# Patient Record
Sex: MALE | Race: BLACK OR AFRICAN AMERICAN | NOT HISPANIC OR LATINO | Employment: FULL TIME | RURAL
[De-identification: names, ages, dates, MRNs, and addresses within clinical notes are randomized per-mention and may not be internally consistent; named-entity substitution may affect disease eponyms.]

---

## 2021-08-13 ENCOUNTER — HOSPITAL ENCOUNTER (EMERGENCY)
Facility: HOSPITAL | Age: 41
Discharge: HOME | End: 2021-08-13
Payer: COMMERCIAL

## 2021-08-13 VITALS
DIASTOLIC BLOOD PRESSURE: 84 MMHG | OXYGEN SATURATION: 100 % | RESPIRATION RATE: 16 BRPM | HEIGHT: 67 IN | WEIGHT: 230 LBS | HEART RATE: 81 BPM | BODY MASS INDEX: 36.1 KG/M2 | TEMPERATURE: 98 F | SYSTOLIC BLOOD PRESSURE: 128 MMHG

## 2021-08-13 DIAGNOSIS — E86.0 DEHYDRATION: Primary | ICD-10-CM

## 2021-08-13 LAB
ALBUMIN SERPL BCP-MCNC: 4.5 G/DL (ref 3.5–5)
ALBUMIN/GLOB SERPL: 1 {RATIO}
ALP SERPL-CCNC: 109 U/L (ref 45–115)
ALT SERPL W P-5'-P-CCNC: 128 U/L (ref 16–61)
ANION GAP SERPL CALCULATED.3IONS-SCNC: 12 MMOL/L (ref 7–16)
ANION GAP SERPL CALCULATED.3IONS-SCNC: 18 MMOL/L (ref 7–16)
AST SERPL W P-5'-P-CCNC: 53 U/L (ref 15–37)
BACTERIA #/AREA URNS HPF: ABNORMAL /HPF
BASOPHILS # BLD AUTO: 0.02 K/UL (ref 0–0.2)
BASOPHILS NFR BLD AUTO: 0.2 % (ref 0–1)
BILIRUB SERPL-MCNC: 1 MG/DL (ref 0–1.2)
BILIRUB UR QL STRIP: ABNORMAL
BUN SERPL-MCNC: 20 MG/DL (ref 7–18)
BUN SERPL-MCNC: 21 MG/DL (ref 7–18)
BUN/CREAT SERPL: 10 (ref 6–20)
BUN/CREAT SERPL: 10 (ref 6–20)
CALCIUM SERPL-MCNC: 7.8 MG/DL (ref 8.5–10.1)
CALCIUM SERPL-MCNC: 8.7 MG/DL (ref 8.5–10.1)
CHLORIDE SERPL-SCNC: 105 MMOL/L (ref 98–107)
CHLORIDE SERPL-SCNC: 99 MMOL/L (ref 98–107)
CK SERPL-CCNC: 400 U/L (ref 39–308)
CLARITY UR: CLEAR
CO2 SERPL-SCNC: 24 MMOL/L (ref 21–32)
CO2 SERPL-SCNC: 28 MMOL/L (ref 21–32)
COLOR UR: ABNORMAL
CREAT SERPL-MCNC: 1.94 MG/DL (ref 0.7–1.3)
CREAT SERPL-MCNC: 2.13 MG/DL (ref 0.7–1.3)
DIFFERENTIAL METHOD BLD: ABNORMAL
EOSINOPHIL # BLD AUTO: 0.06 K/UL (ref 0–0.5)
EOSINOPHIL NFR BLD AUTO: 0.5 % (ref 1–4)
ERYTHROCYTE [DISTWIDTH] IN BLOOD BY AUTOMATED COUNT: 13.7 % (ref 11.5–14.5)
FLUAV AG UPPER RESP QL IA.RAPID: NEGATIVE
FLUBV AG UPPER RESP QL IA.RAPID: NEGATIVE
GLOBULIN SER-MCNC: 4.5 G/DL (ref 2–4)
GLUCOSE SERPL-MCNC: 105 MG/DL (ref 74–106)
GLUCOSE SERPL-MCNC: 140 MG/DL (ref 74–106)
GLUCOSE UR STRIP-MCNC: NEGATIVE MG/DL
HCT VFR BLD AUTO: 46.9 % (ref 40–54)
HGB BLD-MCNC: 16.1 G/DL (ref 13.5–18)
HYALINE CASTS #/AREA URNS LPF: ABNORMAL /LPF
KETONES UR STRIP-SCNC: NEGATIVE MG/DL
LEUKOCYTE ESTERASE UR QL STRIP: NEGATIVE
LYMPHOCYTES # BLD AUTO: 1.99 K/UL (ref 1–4.8)
LYMPHOCYTES NFR BLD AUTO: 17.5 % (ref 27–41)
MAGNESIUM SERPL-MCNC: 2.5 MG/DL (ref 1.7–2.3)
MCH RBC QN AUTO: 31.8 PG (ref 27–31)
MCHC RBC AUTO-ENTMCNC: 34.3 G/DL (ref 32–36)
MCV RBC AUTO: 92.7 FL (ref 80–96)
MONOCYTES # BLD AUTO: 1.04 K/UL (ref 0–0.8)
MONOCYTES NFR BLD AUTO: 9.2 % (ref 2–6)
MPC BLD CALC-MCNC: 10.1 FL (ref 9.4–12.4)
MUCOUS THREADS #/AREA URNS HPF: ABNORMAL /HPF
NEUTROPHILS # BLD AUTO: 8.23 K/UL (ref 1.8–7.7)
NEUTROPHILS NFR BLD AUTO: 72.6 % (ref 53–65)
NITRITE UR QL STRIP: NEGATIVE
PH UR STRIP: 5.5 PH UNITS
PLATELET # BLD AUTO: 199 K/UL (ref 150–400)
POTASSIUM SERPL-SCNC: 3.7 MMOL/L (ref 3.5–5.1)
POTASSIUM SERPL-SCNC: 4.9 MMOL/L (ref 3.5–5.1)
PROT SERPL-MCNC: 9 G/DL (ref 6.4–8.2)
PROT UR QL STRIP: 30
RBC # BLD AUTO: 5.06 M/UL (ref 4.6–6.2)
RBC # UR STRIP: ABNORMAL /UL
RBC #/AREA URNS HPF: ABNORMAL /HPF
SARS-COV+SARS-COV-2 AG RESP QL IA.RAPID: NEGATIVE
SODIUM SERPL-SCNC: 137 MMOL/L (ref 136–145)
SODIUM SERPL-SCNC: 140 MMOL/L (ref 136–145)
SP GR UR STRIP: >=1.03
SQUAMOUS #/AREA URNS LPF: ABNORMAL /LPF
UROBILINOGEN UR STRIP-ACNC: 1 MG/DL
WBC # BLD AUTO: 11.34 K/UL (ref 4.5–11)
WBC #/AREA URNS HPF: ABNORMAL /HPF

## 2021-08-13 PROCEDURE — 85025 COMPLETE CBC W/AUTO DIFF WBC: CPT | Performed by: NURSE PRACTITIONER

## 2021-08-13 PROCEDURE — 36415 COLL VENOUS BLD VENIPUNCTURE: CPT | Performed by: NURSE PRACTITIONER

## 2021-08-13 PROCEDURE — 96361 HYDRATE IV INFUSION ADD-ON: CPT

## 2021-08-13 PROCEDURE — 81001 URINALYSIS AUTO W/SCOPE: CPT | Performed by: NURSE PRACTITIONER

## 2021-08-13 PROCEDURE — 96360 HYDRATION IV INFUSION INIT: CPT

## 2021-08-13 PROCEDURE — 99282 EMERGENCY DEPT VISIT SF MDM: CPT | Mod: ,,, | Performed by: NURSE PRACTITIONER

## 2021-08-13 PROCEDURE — 87428 SARSCOV & INF VIR A&B AG IA: CPT | Performed by: NURSE PRACTITIONER

## 2021-08-13 PROCEDURE — 80048 BASIC METABOLIC PNL TOTAL CA: CPT | Mod: XB | Performed by: NURSE PRACTITIONER

## 2021-08-13 PROCEDURE — 99282 PR EMERGENCY DEPT VISIT,LEVEL II: ICD-10-PCS | Mod: ,,, | Performed by: NURSE PRACTITIONER

## 2021-08-13 PROCEDURE — 82550 ASSAY OF CK (CPK): CPT | Performed by: NURSE PRACTITIONER

## 2021-08-13 PROCEDURE — 83735 ASSAY OF MAGNESIUM: CPT | Performed by: NURSE PRACTITIONER

## 2021-08-13 PROCEDURE — 81003 URINALYSIS AUTO W/O SCOPE: CPT | Performed by: NURSE PRACTITIONER

## 2021-08-13 PROCEDURE — 99284 EMERGENCY DEPT VISIT MOD MDM: CPT | Mod: 25

## 2021-08-13 PROCEDURE — 25000003 PHARM REV CODE 250: Performed by: NURSE PRACTITIONER

## 2021-08-13 PROCEDURE — 80053 COMPREHEN METABOLIC PANEL: CPT | Performed by: NURSE PRACTITIONER

## 2021-08-13 RX ADMIN — SODIUM CHLORIDE 1000 ML: 9 INJECTION, SOLUTION INTRAVENOUS at 04:08

## 2021-08-13 RX ADMIN — SODIUM CHLORIDE 1000 ML: 9 INJECTION, SOLUTION INTRAVENOUS at 05:08

## 2021-08-14 ENCOUNTER — TELEPHONE (OUTPATIENT)
Dept: EMERGENCY MEDICINE | Facility: HOSPITAL | Age: 41
End: 2021-08-14

## 2023-09-19 ENCOUNTER — OFFICE VISIT (OUTPATIENT)
Dept: FAMILY MEDICINE | Facility: CLINIC | Age: 43
End: 2023-09-19

## 2023-09-19 VITALS
BODY MASS INDEX: 33.21 KG/M2 | WEIGHT: 232 LBS | RESPIRATION RATE: 16 BRPM | TEMPERATURE: 98 F | DIASTOLIC BLOOD PRESSURE: 78 MMHG | OXYGEN SATURATION: 98 % | SYSTOLIC BLOOD PRESSURE: 140 MMHG | HEIGHT: 70 IN | HEART RATE: 98 BPM

## 2023-09-19 DIAGNOSIS — R21 PAPULAR RASH, LOCALIZED: ICD-10-CM

## 2023-09-19 DIAGNOSIS — R21 RASH OF BOTH HANDS: Primary | ICD-10-CM

## 2023-09-19 DIAGNOSIS — I10 HYPERTENSION, UNSPECIFIED TYPE: ICD-10-CM

## 2023-09-19 LAB — SYPHILIS AB INTERPRETATION: NORMAL

## 2023-09-19 PROCEDURE — 99203 PR OFFICE/OUTPT VISIT, NEW, LEVL III, 30-44 MIN: ICD-10-PCS | Mod: ,,, | Performed by: FAMILY MEDICINE

## 2023-09-19 PROCEDURE — 86780 TREPONEMA PALLIDUM (SYPHILIS) ANTIBODY: ICD-10-PCS | Mod: ,,, | Performed by: CLINICAL MEDICAL LABORATORY

## 2023-09-19 PROCEDURE — 86780 TREPONEMA PALLIDUM: CPT | Mod: ,,, | Performed by: CLINICAL MEDICAL LABORATORY

## 2023-09-19 PROCEDURE — 99203 OFFICE O/P NEW LOW 30 MIN: CPT | Mod: ,,, | Performed by: FAMILY MEDICINE

## 2023-09-19 RX ORDER — CLOTRIMAZOLE 1 %
1 CREAM (GRAM) TOPICAL 2 TIMES DAILY
COMMUNITY
Start: 2023-05-18

## 2023-09-19 RX ORDER — PREDNISONE 20 MG/1
20 TABLET ORAL DAILY
Qty: 5 TABLET | Refills: 0 | Status: SHIPPED | OUTPATIENT
Start: 2023-09-19 | End: 2023-09-24

## 2023-09-19 RX ORDER — AMLODIPINE BESYLATE 10 MG/1
TABLET ORAL
COMMUNITY
Start: 2023-03-03 | End: 2024-02-22 | Stop reason: SDUPTHER

## 2023-09-19 RX ORDER — TRIAMCINOLONE ACETONIDE 5 MG/G
OINTMENT TOPICAL 2 TIMES DAILY
COMMUNITY
Start: 2023-05-18 | End: 2024-02-22 | Stop reason: SDUPTHER

## 2023-09-19 NOTE — PROGRESS NOTES
Subjective:       Patient ID: Ilene Lewis is a 42 y.o. male.    Chief Complaint: Rash (X 6 months, treated with steroid in past with no relief, palms of hands and soles feet)    Patient is a 41yo M who presents to the clinic with rash on his palms and soles that has been ongoing for the past 6 months. Patient reports the rash is itchy in nature. He reports that he switched body washes at the time that this started. Patient states he has a history of seasonal allergies. Patient reports having taking blood pressure pills in the past but states that they made him feel dizzy and lightheaded.       Current Outpatient Medications:     amLODIPine (NORVASC) 10 MG tablet, , Disp: , Rfl:     clotrimazole (LOTRIMIN) 1 % cream, Apply 1 application  topically 2 (two) times daily., Disp: , Rfl:     predniSONE (DELTASONE) 20 MG tablet, Take 1 tablet (20 mg total) by mouth once daily. for 5 days, Disp: 5 tablet, Rfl: 0    triamcinolone (KENALOG) 0.5 % ointment, Apply topically 2 (two) times daily., Disp: , Rfl:     Review of patient's allergies indicates:  No Known Allergies    Past Medical History:   Diagnosis Date    GERD (gastroesophageal reflux disease)     Hypertension        History reviewed. No pertinent surgical history.    History reviewed. No pertinent family history.    Social History     Tobacco Use    Smoking status: Every Day     Types: Cigarettes    Smokeless tobacco: Never   Substance Use Topics    Alcohol use: Yes    Drug use: Not Currently       Review of Systems   Constitutional:  Negative for fatigue and fever.   Cardiovascular:  Negative for chest pain and leg swelling.   Gastrointestinal:  Negative for diarrhea, nausea and vomiting.   Integumentary:  Positive for rash.   Neurological:  Negative for weakness and headaches.   Psychiatric/Behavioral:  Negative for behavioral problems and confusion.        Current Medications:   Medication List with Changes/Refills   New Medications    PREDNISONE (DELTASONE) 20  "MG TABLET    Take 1 tablet (20 mg total) by mouth once daily. for 5 days       Start Date: 9/19/2023 End Date: 9/24/2023   Current Medications    AMLODIPINE (NORVASC) 10 MG TABLET           Start Date: 3/3/2023  End Date: --    CLOTRIMAZOLE (LOTRIMIN) 1 % CREAM    Apply 1 application  topically 2 (two) times daily.       Start Date: 5/18/2023 End Date: --    TRIAMCINOLONE (KENALOG) 0.5 % OINTMENT    Apply topically 2 (two) times daily.       Start Date: 5/18/2023 End Date: --            Objective:        Vitals:    09/19/23 0900 09/19/23 0901   BP: (!) 143/80 (!) 140/78   BP Location: Left arm Left arm   Patient Position: Sitting Sitting   BP Method: Large (Automatic) Large (Automatic)   Pulse: 98    Resp: 16    Temp: 98.1 °F (36.7 °C)    TempSrc: Oral    SpO2: 98%    Weight: 105.2 kg (232 lb)    Height: 5' 10" (1.778 m)        Physical Exam  Vitals and nursing note reviewed.   Constitutional:       Appearance: Normal appearance.   HENT:      Head: Normocephalic.      Right Ear: External ear normal.      Left Ear: External ear normal.      Nose: Nose normal.      Mouth/Throat:      Mouth: Mucous membranes are moist.      Pharynx: Oropharynx is clear.   Eyes:      Conjunctiva/sclera: Conjunctivae normal.   Cardiovascular:      Rate and Rhythm: Normal rate and regular rhythm.      Pulses: Normal pulses.      Heart sounds: Normal heart sounds.   Pulmonary:      Effort: Pulmonary effort is normal.      Breath sounds: Normal breath sounds.   Abdominal:      General: Abdomen is flat. Bowel sounds are normal.      Palpations: Abdomen is soft.   Musculoskeletal:      Right lower leg: No edema.      Left lower leg: No edema.   Skin:     Findings: Rash present.      Comments: Pinpoint papular rash on palms and soles of feet.    Neurological:      Mental Status: He is alert.           Lab Results   Component Value Date    WBC 11.34 (H) 08/13/2021    HGB 16.1 08/13/2021    HCT 46.9 08/13/2021     08/13/2021     " (H) 08/13/2021    AST 53 (H) 08/13/2021     08/13/2021    K 4.9 08/13/2021     08/13/2021    CREATININE 1.94 (H) 08/13/2021    BUN 20 (H) 08/13/2021    CO2 28 08/13/2021      Assessment:       1. Rash of both hands    2. Papular rash, localized    3. Hypertension, unspecified type        Plan:         Problem List Items Addressed This Visit          Derm    Papular rash, localized     Pin point lesions on palm and soles of feet. Patient had failed topical regimen of antifungal and kenalog. Will start patient on oral dose of steroids for 5 days, recommended stopping the body wash and go back to his old stuff. Will get syphilis antibody to rule out syphilis.             Cardiac/Vascular    HTN (hypertension)     Patient reports taking amlodipine 10mg but made him light headed. Recommended that he take a half a dose and to log his blood pressures at home and bring them next visit.           Other Visit Diagnoses       Rash of both hands    -  Primary    Relevant Medications    predniSONE (DELTASONE) 20 MG tablet    Other Relevant Orders    Syphilis Antibody with reflex to RPR              Follow up in about 2 weeks (around 10/3/2023).    Eulalio Rhodes,      Instructed patient that if symptoms fail to improve or worsen patient should seek immediate medical attention or report to the nearest emergency department. Patient expressed verbal agreement and understanding to this plan of care.

## 2023-09-19 NOTE — PROGRESS NOTES
I have reviewed the notes, assessments, and/or procedures performed by DR Rhodes, I concur with his documentation of Erric Joshua. Suspect dyshydrotic eczema given his distribution of rash but in abundance of caution given our outbreak locally send off RPR today as well.

## 2023-09-19 NOTE — ASSESSMENT & PLAN NOTE
Pin point lesions on palm and soles of feet. Patient had failed topical regimen of antifungal and kenalog. Will start patient on oral dose of steroids for 5 days, recommended stopping the body wash and go back to his old stuff. Will get syphilis antibody to rule out syphilis.

## 2023-09-19 NOTE — ASSESSMENT & PLAN NOTE
Patient reports taking amlodipine 10mg but made him light headed. Recommended that he take a half a dose and to log his blood pressures at home and bring them next visit.

## 2024-02-22 ENCOUNTER — HOSPITAL ENCOUNTER (OUTPATIENT)
Dept: RADIOLOGY | Facility: HOSPITAL | Age: 44
Discharge: HOME | End: 2024-02-22
Attending: FAMILY MEDICINE

## 2024-02-22 ENCOUNTER — OFFICE VISIT (OUTPATIENT)
Dept: FAMILY MEDICINE | Facility: CLINIC | Age: 44
End: 2024-02-22

## 2024-02-22 VITALS
SYSTOLIC BLOOD PRESSURE: 124 MMHG | RESPIRATION RATE: 16 BRPM | TEMPERATURE: 98 F | HEIGHT: 70 IN | DIASTOLIC BLOOD PRESSURE: 76 MMHG | BODY MASS INDEX: 33.07 KG/M2 | HEART RATE: 92 BPM | OXYGEN SATURATION: 98 % | WEIGHT: 231 LBS

## 2024-02-22 DIAGNOSIS — N18.31 STAGE 3A CHRONIC KIDNEY DISEASE: Primary | ICD-10-CM

## 2024-02-22 DIAGNOSIS — Z13.1 SCREENING FOR DIABETES MELLITUS: ICD-10-CM

## 2024-02-22 DIAGNOSIS — G89.29 CHRONIC PAIN OF BOTH KNEES: Chronic | ICD-10-CM

## 2024-02-22 DIAGNOSIS — Z29.9 PREVENTIVE MEASURE: ICD-10-CM

## 2024-02-22 DIAGNOSIS — D72.829 LEUKOCYTOSIS, UNSPECIFIED TYPE: Chronic | ICD-10-CM

## 2024-02-22 DIAGNOSIS — F17.200 SMOKER: Chronic | ICD-10-CM

## 2024-02-22 DIAGNOSIS — M25.562 CHRONIC PAIN OF BOTH KNEES: Chronic | ICD-10-CM

## 2024-02-22 DIAGNOSIS — I10 HYPERTENSION, UNSPECIFIED TYPE: ICD-10-CM

## 2024-02-22 DIAGNOSIS — R53.83 FATIGUE, UNSPECIFIED TYPE: ICD-10-CM

## 2024-02-22 DIAGNOSIS — R21 PAPULAR RASH, LOCALIZED: ICD-10-CM

## 2024-02-22 DIAGNOSIS — R74.01 TRANSAMINITIS: ICD-10-CM

## 2024-02-22 DIAGNOSIS — R21 RASH OF BOTH HANDS: ICD-10-CM

## 2024-02-22 DIAGNOSIS — E55.9 VITAMIN D INSUFFICIENCY: ICD-10-CM

## 2024-02-22 DIAGNOSIS — M19.90 OSTEOARTHRITIS, UNSPECIFIED OSTEOARTHRITIS TYPE, UNSPECIFIED SITE: ICD-10-CM

## 2024-02-22 DIAGNOSIS — E78.5 HYPERLIPIDEMIA, UNSPECIFIED HYPERLIPIDEMIA TYPE: ICD-10-CM

## 2024-02-22 DIAGNOSIS — M25.561 CHRONIC PAIN OF BOTH KNEES: Chronic | ICD-10-CM

## 2024-02-22 DIAGNOSIS — E83.41 HYPERMAGNESEMIA: ICD-10-CM

## 2024-02-22 PROBLEM — N18.30 STAGE 3 CHRONIC KIDNEY DISEASE: Chronic | Status: ACTIVE | Noted: 2024-02-22

## 2024-02-22 PROCEDURE — 73560 X-RAY EXAM OF KNEE 1 OR 2: CPT | Mod: 26,50,, | Performed by: STUDENT IN AN ORGANIZED HEALTH CARE EDUCATION/TRAINING PROGRAM

## 2024-02-22 PROCEDURE — 73560 X-RAY EXAM OF KNEE 1 OR 2: CPT | Mod: TC,50

## 2024-02-22 PROCEDURE — 99213 OFFICE O/P EST LOW 20 MIN: CPT | Mod: ,,, | Performed by: FAMILY MEDICINE

## 2024-02-22 RX ORDER — MELOXICAM 7.5 MG/1
7.5 TABLET ORAL
COMMUNITY
Start: 2024-01-25 | End: 2024-02-22 | Stop reason: ALTCHOICE

## 2024-02-22 RX ORDER — HYDROCHLOROTHIAZIDE 12.5 MG/1
12.5 TABLET ORAL DAILY
Qty: 90 TABLET | Refills: 0 | Status: SHIPPED | OUTPATIENT
Start: 2024-02-22 | End: 2024-05-22

## 2024-02-22 RX ORDER — AMLODIPINE BESYLATE 10 MG/1
10 TABLET ORAL DAILY
Qty: 90 TABLET | Refills: 0 | Status: SHIPPED | OUTPATIENT
Start: 2024-02-22 | End: 2024-05-22

## 2024-02-22 RX ORDER — TRIAMCINOLONE ACETONIDE 5 MG/G
OINTMENT TOPICAL 2 TIMES DAILY PRN
Qty: 15 G | Refills: 0 | Status: SHIPPED | OUTPATIENT
Start: 2024-02-22

## 2024-02-22 RX ORDER — HYDROCHLOROTHIAZIDE 12.5 MG/1
12.5 TABLET ORAL DAILY
COMMUNITY
End: 2024-02-22 | Stop reason: SDUPTHER

## 2024-02-22 NOTE — ASSESSMENT & PLAN NOTE
- Mostly improved   - RPR negative and reject HIV testing  - Continue with Kenalog ointment as needed

## 2024-02-22 NOTE — ASSESSMENT & PLAN NOTE
Mild transaminitis noted in the last CMP two years ago but improved in the last CMP with AST/ALT of 27/83 from 53/128 two years ago and ALK of 103 from 109    This could have been because of dehydration two years ago but we will continue to monitor liver function

## 2024-02-22 NOTE — ASSESSMENT & PLAN NOTE
- BMP about two years ago showed CR of 1.9 with unknown baseline but CMP performed at this visit showed improved renal function with CR 1.37 and GFR 66. This indicate improved CKD stage 2 from Stage 3 which likely due to CHAVO from  dehydration  - Discontinue Mobic as use for knee pain  - Avoid nephrotoxic drugs  - Renal dose applicable medications  - Will continue optimize blood glucose and blood pressure  -

## 2024-02-22 NOTE — ASSESSMENT & PLAN NOTE
- Etiology of knee is unknown but has been present for more than 2 years  - CBC showed chronic leukocytosis but knee Xray is negative for acute finding or degenerative change suggestive of OA  - Discontinue Mobic as used for knee pain due to chronic renal disease  - May consider tylenol for knee pain after normalize liver function test  - Patient may benefit from PT referral  - Workup diagnosis include gout, pseudogout, inflammatory arthroplasty and RA. We will continue the workup in the next visit to include Uric acid, ESR, CRP, TEA....  -

## 2024-02-22 NOTE — ASSESSMENT & PLAN NOTE
- Blood pressure currently controlled 124/76 with pulse of 92  - Continue HCTZ 12.5 mg daily and Norvasc 10 mg daily  - Continue to monitor blood pressure and adjust medication as indicated

## 2024-02-22 NOTE — PROGRESS NOTES
Lab results from 2/22/2024  1) CBC showed chronic leukocyte with WBC of 11.3 which is the same as it was two years ago, H&H normal at 15.3/44.7, and   2) CMP grossly normal with Na 141, K 4.6, CR of 1.37 from 1.94 two years ago due to dehydration, Liver enzymes improved with Alkp of 103 from 109 two years ago, and AST/ALT of 27/83 from 53 and 128 two years ago.   3) Lipid panel grossly normal except for Chol 232 and HDL 35  4) A1c normal at 5.4  5) Mg normal at 2.3 from 2.5 two years ago, phosphorus normal at 3.1, and UA negative  6) Vit D of 28, indicative of Vit D insufficiency. So, we will start vitamin D replacement.  Knee x ray was negative for acute findings and degenerative changes.      Subjective:       Patient ID: Ilene Lewis is a 43 y.o. male.    Chief Complaint: Hypertension, Knee Pain (BILATERAL X 3-4 MONTHS, PAIN MED NOT HELPING, NO H/O TRAUMA), and Rash (COMES AND GOES X 6 MONTHS, RIGHT WRIST AND FEET, NEEDS REFILLS, MEDICATION MAKES IT GO AWAY)    HPI    Patient is a 44 yo male who presents to the clinic to establish care. He does not have much complaint except for chronic bilateral knee pain ( L>R) started about two years ago. The knee pain is rated  mild to moderate and intermittent in nature, but became more severe and persistent over the past 3-4 months. Pain is worse with standing and at night. Patient takes Mobic for the pain but noted that this does not seem to help anymore with the pain.. He denies any trauma or history of osteoarthritis or rheumatoid arthritis. Furthermore, he denies any associated headache, fever, chills, nausea, vomiting or swelling.  He was seen in the clinic in September 2023 for a rash on his right palm and sole which had been present for 6 months but RPR was negative.. He was started on Kenalog ointment with marked improvement of his rash. He is sexually active in a monogamous relationship with his wife. He is a social drinker but smokes about one pack of  cigarettes every three days for about 27 years (9 pack years).   As per chart review, lab work about two years ago was significant for CR of 1.94 and GFR of 50 with unknown baseline as well as a mild transaminitis.  Otherwise, the patient has no other concerns or complaints.     Current Outpatient Medications:     clotrimazole (LOTRIMIN) 1 % cream, Apply 1 application  topically 2 (two) times daily., Disp: , Rfl:     amLODIPine (NORVASC) 10 MG tablet, Take 1 tablet (10 mg total) by mouth once daily., Disp: 90 tablet, Rfl: 0    hydroCHLOROthiazide (HYDRODIURIL) 12.5 MG Tab, Take 1 tablet (12.5 mg total) by mouth once daily., Disp: 90 tablet, Rfl: 0    triamcinolone (KENALOG) 0.5 % ointment, Apply topically 2 (two) times daily as needed (Rash)., Disp: 15 g, Rfl: 0    Review of patient's allergies indicates:  No Known Allergies    Past Medical History:   Diagnosis Date    GERD (gastroesophageal reflux disease)     Hypertension        History reviewed. No pertinent surgical history.    History reviewed. No pertinent family history.    Social History     Tobacco Use    Smoking status: Every Day     Types: Cigarettes    Smokeless tobacco: Never   Substance Use Topics    Alcohol use: Yes    Drug use: Not Currently       Review of Systems   Constitutional:  Negative for activity change, appetite change, fatigue and fever.   HENT:  Negative for sneezing, sore throat and tinnitus.    Eyes:  Negative for pain, discharge, itching and visual disturbance.   Respiratory:  Negative for cough, chest tightness, shortness of breath and wheezing.    Cardiovascular:  Negative for chest pain and leg swelling.   Gastrointestinal:  Negative for abdominal distention, abdominal pain, blood in stool, diarrhea, nausea and vomiting.   Genitourinary:  Negative for dysuria and hematuria.   Musculoskeletal:  Negative for neck pain and neck stiffness.   Integumentary:  Positive for color change and rash. Negative for wound and mole/lesion.  "  Neurological:  Negative for vertigo, tremors, seizures, syncope, speech difficulty, weakness and numbness.   Psychiatric/Behavioral:  Negative for agitation, behavioral problems and confusion.          Current Medications:   Medication List with Changes/Refills   Current Medications    CLOTRIMAZOLE (LOTRIMIN) 1 % CREAM    Apply 1 application  topically 2 (two) times daily.       Start Date: 5/18/2023 End Date: --   Changed and/or Refilled Medications    Modified Medication Previous Medication    AMLODIPINE (NORVASC) 10 MG TABLET amLODIPine (NORVASC) 10 MG tablet       Take 1 tablet (10 mg total) by mouth once daily.           Start Date: 2/22/2024 End Date: 5/22/2024    Start Date: 3/3/2023  End Date: 2/22/2024    HYDROCHLOROTHIAZIDE (HYDRODIURIL) 12.5 MG TAB hydroCHLOROthiazide (HYDRODIURIL) 12.5 MG Tab       Take 1 tablet (12.5 mg total) by mouth once daily.    Take 12.5 mg by mouth once daily.       Start Date: 2/22/2024 End Date: 5/22/2024    Start Date: --        End Date: 2/22/2024    TRIAMCINOLONE (KENALOG) 0.5 % OINTMENT triamcinolone (KENALOG) 0.5 % ointment       Apply topically 2 (two) times daily as needed (Rash).    Apply topically 2 (two) times daily.       Start Date: 2/22/2024 End Date: --    Start Date: 5/18/2023 End Date: 2/22/2024   Discontinued Medications    MELOXICAM (MOBIC) 7.5 MG TABLET    Take 7.5 mg by mouth.       Start Date: 1/25/2024 End Date: 2/22/2024            Objective:        Vitals:    02/22/24 0858   BP: 124/76   BP Location: Left arm   Patient Position: Sitting   BP Method: Medium (Automatic)   Pulse: 92   Resp: 16   Temp: 98 °F (36.7 °C)   TempSrc: Oral   SpO2: 98%   Weight: 104.8 kg (231 lb)   Height: 5' 10" (1.778 m)       Physical Exam  Vitals and nursing note reviewed.   Constitutional:       Appearance: Normal appearance.   HENT:      Head: Normocephalic.      Right Ear: External ear normal.      Left Ear: External ear normal.      Nose: Nose normal.      Mouth/Throat: "      Mouth: Mucous membranes are moist.      Pharynx: Oropharynx is clear. No oropharyngeal exudate or posterior oropharyngeal erythema.   Eyes:      General: No scleral icterus.        Right eye: No discharge.         Left eye: No discharge.      Extraocular Movements: Extraocular movements intact.      Conjunctiva/sclera: Conjunctivae normal.   Cardiovascular:      Rate and Rhythm: Normal rate and regular rhythm.      Pulses: Normal pulses.      Heart sounds: Normal heart sounds. No murmur heard.  Pulmonary:      Effort: Pulmonary effort is normal.      Breath sounds: Normal breath sounds. No wheezing, rhonchi or rales.   Abdominal:      General: Abdomen is flat. Bowel sounds are normal.      Palpations: Abdomen is soft.      Tenderness: There is no abdominal tenderness. There is no guarding or rebound.   Musculoskeletal:      Cervical back: Neck supple.      Right lower leg: No edema.      Left lower leg: No edema.      Comments: Bilateral knee but no crepitus noted, neuro vascularly intact, normal strength bilateral with negative Esther and Lachman test   Skin:     General: Skin is warm.      Coloration: Skin is not jaundiced.      Findings: Rash present.   Neurological:      General: No focal deficit present.      Mental Status: He is alert and oriented to person, place, and time. Mental status is at baseline.   Psychiatric:         Mood and Affect: Mood normal.         Behavior: Behavior normal.         Thought Content: Thought content normal.         Judgment: Judgment normal.             Lab Results   Component Value Date    WBC 11.34 (H) 02/22/2024    HGB 15.3 02/22/2024    HCT 44.7 02/22/2024     02/22/2024    CHOL 139 02/22/2024    TRIG 232 (H) 02/22/2024    HDL 39 (L) 02/22/2024    ALT 83 (H) 02/22/2024    AST 27 02/22/2024     02/22/2024    K 4.6 02/22/2024     (H) 02/22/2024    CREATININE 1.37 (H) 02/22/2024    BUN 13 02/22/2024    CO2 27 02/22/2024    TSH 0.870 02/22/2024     HGBA1C 5.4 02/22/2024      Assessment:       1. Stage 3a chronic kidney disease    2. Hypertension, unspecified type    3. Screening for diabetes mellitus    4. Hyperlipidemia, unspecified hyperlipidemia type    5. Preventive measure    6. Osteoarthritis, unspecified osteoarthritis type, unspecified site    7. Fatigue, unspecified type    8. Hypermagnesemia    9. Rash of both hands    10. Transaminitis    11. Papular rash, localized    12. Chronic pain of both knees    13. Smoker    14. Leukocytosis, unspecified type    15. Vitamin D insufficiency        Plan:         Problem List Items Addressed This Visit          Derm    Papular rash, localized     - Mostly improved   - RPR negative and reject HIV testing  - Continue with Kenalog ointment as needed            Cardiac/Vascular    HTN (hypertension)     - Blood pressure currently controlled 124/76 with pulse of 92  - Continue HCTZ 12.5 mg daily and Norvasc 10 mg daily  - Continue to monitor blood pressure and adjust medication as indicated         Relevant Medications    amLODIPine (NORVASC) 10 MG tablet    hydroCHLOROthiazide (HYDRODIURIL) 12.5 MG Tab    Other Relevant Orders    Comprehensive Metabolic Panel (Completed)    Urinalysis (Completed)       Renal/    Stage 2 chronic kidney disease - Primary     - BMP about two years ago showed CR of 1.9 with unknown baseline but CMP performed at this visit showed improved renal function with CR 1.37 and GFR 66. This indicate improved CKD stage 2 from Stage 3 which likely due to CHAVO from  dehydration  - Discontinue Mobic as use for knee pain  - Avoid nephrotoxic drugs  - Renal dose applicable medications  - Will continue optimize blood glucose and blood pressure  -            Oncology    Leukocytosis (Chronic)     - WBC 11.3 about the same as it was two years ago.  - The etiology of this leukocytosis is unclear  - Will proceed with peripheral smear in the next visit            Endocrine    Vitamin D insufficiency     -  Vitamin D level of 28  - Start Vit D3 at 2000 Unit daily  -             GI    Transaminitis     Mild transaminitis noted in the last CMP two years ago but improved in the last CMP with AST/ALT of 27/83 from 53/128 two years ago and ALK of 103 from 109    This could have been because of dehydration two years ago but we will continue to monitor liver function            Orthopedic    Pain in both knees (Chronic)     - Etiology of knee is unknown but has been present for more than 2 years  - CBC showed chronic leukocytosis but knee Xray is negative for acute finding or degenerative change suggestive of OA  - Discontinue Mobic as used for knee pain due to chronic renal disease  - May consider tylenol for knee pain after normalize liver function test  - Patient may benefit from PT referral  - Workup diagnosis include gout, pseudogout, inflammatory arthroplasty and RA. We will continue the workup in the next visit to include Uric acid, ESR, CRP, TEA....  -             Other    Smoker (Chronic)     - Smokes 1 pack per 3 days for 36 year          Other Visit Diagnoses       Screening for diabetes mellitus        Relevant Orders    Hemoglobin A1C (Completed)    Hyperlipidemia, unspecified hyperlipidemia type        Relevant Orders    Lipid Panel (Completed)    Preventive measure        Relevant Orders    Hepatitis C Antibody (Completed)    Osteoarthritis, unspecified osteoarthritis type, unspecified site        Relevant Orders    X-Ray Knee 1 or 2 View Bilateral (Completed)    Fatigue, unspecified type        Relevant Orders    TSH (Completed)    Hypermagnesemia        Relevant Orders    Magnesium (Completed)    Rash of both hands        Relevant Medications    triamcinolone (KENALOG) 0.5 % ointment              Follow up in about 1 week (around 2/29/2024).    Pramod Cruz MD     Instructed patient that if symptoms fail to improve or worsen patient should seek immediate medical attention or report to the nearest emergency  department. Patient expressed verbal agreement and understanding to this plan of care.

## 2024-02-24 PROBLEM — E55.9 VITAMIN D INSUFFICIENCY: Status: ACTIVE | Noted: 2024-02-24

## 2024-02-24 PROBLEM — D72.829 LEUKOCYTOSIS: Chronic | Status: ACTIVE | Noted: 2024-02-24

## 2024-02-24 PROBLEM — N18.2 STAGE 2 CHRONIC KIDNEY DISEASE: Status: ACTIVE | Noted: 2024-02-22

## 2024-02-24 NOTE — ASSESSMENT & PLAN NOTE
- WBC 11.3 about the same as it was two years ago.  - The etiology of this leukocytosis is unclear  - Will proceed with peripheral smear in the next visit

## 2024-02-27 ENCOUNTER — OFFICE VISIT (OUTPATIENT)
Dept: FAMILY MEDICINE | Facility: CLINIC | Age: 44
End: 2024-02-27

## 2024-02-27 VITALS
WEIGHT: 231 LBS | HEART RATE: 104 BPM | RESPIRATION RATE: 18 BRPM | BODY MASS INDEX: 33.07 KG/M2 | TEMPERATURE: 99 F | HEIGHT: 70 IN | SYSTOLIC BLOOD PRESSURE: 122 MMHG | OXYGEN SATURATION: 99 % | DIASTOLIC BLOOD PRESSURE: 87 MMHG

## 2024-02-27 DIAGNOSIS — I10 HYPERTENSION, UNSPECIFIED TYPE: ICD-10-CM

## 2024-02-27 DIAGNOSIS — R21 PAPULAR RASH, LOCALIZED: Primary | ICD-10-CM

## 2024-02-27 DIAGNOSIS — M25.562 CHRONIC PAIN OF BOTH KNEES: Chronic | ICD-10-CM

## 2024-02-27 DIAGNOSIS — G89.29 CHRONIC PAIN OF BOTH KNEES: Chronic | ICD-10-CM

## 2024-02-27 DIAGNOSIS — M25.561 CHRONIC PAIN OF BOTH KNEES: Chronic | ICD-10-CM

## 2024-02-27 PROCEDURE — 99214 OFFICE O/P EST MOD 30 MIN: CPT | Mod: ,,, | Performed by: FAMILY MEDICINE

## 2024-02-27 NOTE — ASSESSMENT & PLAN NOTE
Discussed with patient to continue topical kenalog and have him follow back up before he leaves to go back to [place] for work. He was agreeable with this plan.

## 2024-02-27 NOTE — ASSESSMENT & PLAN NOTE
Xray no acute findings, discussed with patient to try some exercise and weight loss will help. He was agreeable to this plan if they continue to hurt, will look into further imaging/PT workup.

## 2024-02-27 NOTE — PROGRESS NOTES
Subjective:       Patient ID: Ilene Lewis is a 43 y.o. male.    Chief Complaint: Chronic Kidney Disease, Hypertension, Knee Pain, and Follow-up (Room 6 f/u HTN, )    Patient is a 44 yo M who presents to the clinic follow regarding his rash on his palms and soles. Patient reports the rash has improved compared to last time when I saw him. Patient states he has a history of seasonal allergies.        Current Outpatient Medications:     amLODIPine (NORVASC) 10 MG tablet, Take 1 tablet (10 mg total) by mouth once daily., Disp: 90 tablet, Rfl: 0    clotrimazole (LOTRIMIN) 1 % cream, Apply 1 application  topically 2 (two) times daily., Disp: , Rfl:     hydroCHLOROthiazide (HYDRODIURIL) 12.5 MG Tab, Take 1 tablet (12.5 mg total) by mouth once daily., Disp: 90 tablet, Rfl: 0    triamcinolone (KENALOG) 0.5 % ointment, Apply topically 2 (two) times daily as needed (Rash)., Disp: 15 g, Rfl: 0    Review of patient's allergies indicates:  No Known Allergies    Past Medical History:   Diagnosis Date    GERD (gastroesophageal reflux disease)     Hypertension        History reviewed. No pertinent surgical history.    History reviewed. No pertinent family history.    Social History     Tobacco Use    Smoking status: Every Day     Types: Cigarettes    Smokeless tobacco: Never   Substance Use Topics    Alcohol use: Yes    Drug use: Not Currently       Review of Systems   Constitutional:  Negative for activity change, appetite change, fatigue and fever.   HENT:  Negative for sneezing, sore throat and tinnitus.    Eyes:  Negative for pain, discharge, itching and visual disturbance.   Respiratory:  Negative for cough, chest tightness, shortness of breath and wheezing.    Cardiovascular:  Negative for chest pain and leg swelling.   Gastrointestinal:  Negative for abdominal distention, abdominal pain, blood in stool, diarrhea, nausea and vomiting.   Genitourinary:  Negative for dysuria and hematuria.   Musculoskeletal:  Negative for neck  "pain and neck stiffness.   Integumentary:  Positive for color change and rash. Negative for wound and mole/lesion.   Neurological:  Negative for vertigo, tremors, seizures, syncope, speech difficulty, weakness and numbness.   Psychiatric/Behavioral:  Negative for agitation, behavioral problems and confusion.          Current Medications:   Medication List with Changes/Refills   Current Medications    AMLODIPINE (NORVASC) 10 MG TABLET    Take 1 tablet (10 mg total) by mouth once daily.       Start Date: 2/22/2024 End Date: 5/22/2024    CLOTRIMAZOLE (LOTRIMIN) 1 % CREAM    Apply 1 application  topically 2 (two) times daily.       Start Date: 5/18/2023 End Date: --    HYDROCHLOROTHIAZIDE (HYDRODIURIL) 12.5 MG TAB    Take 1 tablet (12.5 mg total) by mouth once daily.       Start Date: 2/22/2024 End Date: 5/22/2024    TRIAMCINOLONE (KENALOG) 0.5 % OINTMENT    Apply topically 2 (two) times daily as needed (Rash).       Start Date: 2/22/2024 End Date: --            Objective:        Vitals:    02/27/24 0906   BP: 122/87   BP Location: Left arm   Patient Position: Sitting   BP Method: Medium (Automatic)   Pulse: 104   Resp: 18   Temp: 98.7 °F (37.1 °C)   TempSrc: Oral   SpO2: 99%   Weight: 104.8 kg (231 lb)   Height: 5' 10" (1.778 m)       Physical Exam  Vitals and nursing note reviewed.   Constitutional:       Appearance: Normal appearance.   HENT:      Head: Normocephalic.      Right Ear: External ear normal.      Left Ear: External ear normal.      Nose: Nose normal.      Mouth/Throat:      Mouth: Mucous membranes are moist.      Pharynx: Oropharynx is clear.   Eyes:      Conjunctiva/sclera: Conjunctivae normal.   Cardiovascular:      Rate and Rhythm: Normal rate and regular rhythm.      Pulses: Normal pulses.      Heart sounds: Normal heart sounds.   Pulmonary:      Effort: Pulmonary effort is normal.      Breath sounds: Normal breath sounds.   Abdominal:      General: Abdomen is flat. Bowel sounds are normal.      " Palpations: Abdomen is soft.   Musculoskeletal:      Right lower leg: No edema.      Left lower leg: No edema.   Skin:     Findings: Rash present.   Neurological:      Mental Status: He is alert.             Lab Results   Component Value Date    WBC 11.34 (H) 02/22/2024    HGB 15.3 02/22/2024    HCT 44.7 02/22/2024     02/22/2024    CHOL 139 02/22/2024    TRIG 232 (H) 02/22/2024    HDL 39 (L) 02/22/2024    ALT 83 (H) 02/22/2024    AST 27 02/22/2024     02/22/2024    K 4.6 02/22/2024     (H) 02/22/2024    CREATININE 1.37 (H) 02/22/2024    BUN 13 02/22/2024    CO2 27 02/22/2024    TSH 0.870 02/22/2024    HGBA1C 5.4 02/22/2024      Assessment:       1. Papular rash, localized    2. Hypertension, unspecified type    3. Chronic pain of both knees        Plan:         Problem List Items Addressed This Visit          Derm    Papular rash, localized - Primary     Discussed with patient to continue topical kenalog and have him follow back up before he leaves to go back to [place] for work. He was agreeable with this plan.             Cardiac/Vascular    HTN (hypertension)     Well controlled on current medications. Will continue.             Orthopedic    Pain in both knees (Chronic)     Xray no acute findings, discussed with patient to try some exercise and weight loss will help. He was agreeable to this plan if they continue to hurt, will look into further imaging/PT workup.               Follow up in about 1 month (around 3/27/2024).    Eulalio Rhodes DO     Instructed patient that if symptoms fail to improve or worsen patient should seek immediate medical attention or report to the nearest emergency department. Patient expressed verbal agreement and understanding to this plan of care.

## 2024-07-10 DIAGNOSIS — I10 HYPERTENSION, UNSPECIFIED TYPE: ICD-10-CM

## 2024-07-10 RX ORDER — HYDROCHLOROTHIAZIDE 12.5 MG/1
12.5 TABLET ORAL DAILY
Qty: 90 TABLET | Refills: 0 | Status: SHIPPED | OUTPATIENT
Start: 2024-07-10 | End: 2024-10-08

## 2024-07-10 RX ORDER — AMLODIPINE BESYLATE 10 MG/1
10 TABLET ORAL DAILY
Qty: 90 TABLET | Refills: 0 | Status: SHIPPED | OUTPATIENT
Start: 2024-07-10 | End: 2024-10-08

## 2024-07-10 NOTE — TELEPHONE ENCOUNTER
----- Message from Gilmar Aden sent at 7/9/2024 10:34 AM CDT -----  Pt called again saying that he needs his med........  ----- Message -----  From: Gilmar Aden  Sent: 7/8/2024   9:19 AM CDT  To: #    B/P MED TO W/M [address] [place] [address] PT [phone]